# Patient Record
Sex: FEMALE | Race: WHITE | NOT HISPANIC OR LATINO | URBAN - METROPOLITAN AREA
[De-identification: names, ages, dates, MRNs, and addresses within clinical notes are randomized per-mention and may not be internally consistent; named-entity substitution may affect disease eponyms.]

---

## 2019-08-20 ENCOUNTER — CLINICAL SUPPORT (OUTPATIENT)
Dept: FAMILY MEDICINE | Facility: CLINIC | Age: 64
End: 2019-08-20

## 2019-08-20 DIAGNOSIS — Z00.00 ROUTINE GENERAL MEDICAL EXAMINATION AT A HEALTH CARE FACILITY: Primary | ICD-10-CM

## 2019-08-20 PROCEDURE — 80305 DRUG TEST PRSMV DIR OPT OBS: CPT | Mod: S$GLB,,, | Performed by: FAMILY MEDICINE

## 2019-08-20 PROCEDURE — 86580 PR  TB INTRADERMAL TEST: ICD-10-PCS | Mod: S$GLB,,, | Performed by: FAMILY MEDICINE

## 2019-08-20 PROCEDURE — 80305 PR NON-DOT DRUG SCREENS: ICD-10-PCS | Mod: S$GLB,,, | Performed by: FAMILY MEDICINE

## 2019-08-20 PROCEDURE — 86580 TB INTRADERMAL TEST: CPT | Mod: S$GLB,,, | Performed by: FAMILY MEDICINE

## 2019-08-20 NOTE — PROGRESS NOTES
Claudine  has presented today for a Tb placement screening on behalf of [facility].  Claudine Crawley has completed Tb placement and non-dot drug screen     Total $60

## 2019-08-23 ENCOUNTER — TELEPHONE (OUTPATIENT)
Dept: FAMILY MEDICINE | Facility: CLINIC | Age: 64
End: 2019-08-23

## 2019-08-23 NOTE — TELEPHONE ENCOUNTER
Called pt back explained that her drug test results are not completed yet she called to give us her Drs name and his name Dr roel diaz fax [phone]

## 2019-08-28 ENCOUNTER — TELEPHONE (OUTPATIENT)
Dept: FAMILY MEDICINE | Facility: CLINIC | Age: 64
End: 2019-08-28

## 2020-05-04 ENCOUNTER — OFFICE VISIT (OUTPATIENT)
Dept: FAMILY MEDICINE | Facility: CLINIC | Age: 65
End: 2020-05-04
Payer: COMMERCIAL

## 2020-05-04 ENCOUNTER — TELEPHONE (OUTPATIENT)
Dept: FAMILY MEDICINE | Facility: CLINIC | Age: 65
End: 2020-05-04

## 2020-05-04 VITALS
TEMPERATURE: 98 F | SYSTOLIC BLOOD PRESSURE: 124 MMHG | BODY MASS INDEX: 34.91 KG/M2 | DIASTOLIC BLOOD PRESSURE: 76 MMHG | WEIGHT: 197 LBS | HEIGHT: 63 IN | HEART RATE: 61 BPM

## 2020-05-04 DIAGNOSIS — Z01.818 PREOP GENERAL PHYSICAL EXAM: Primary | ICD-10-CM

## 2020-05-04 DIAGNOSIS — K80.20 GALLSTONES: ICD-10-CM

## 2020-05-04 PROBLEM — F32.0 MILD MAJOR DEPRESSION (H): Chronic | Status: ACTIVE | Noted: 2020-05-04

## 2020-05-04 PROBLEM — J30.2 SEASONAL ALLERGIC RHINITIS, UNSPECIFIED TRIGGER: Status: ACTIVE | Noted: 2020-05-04

## 2020-05-04 PROBLEM — F41.9 ANXIETY: Status: ACTIVE | Noted: 2020-05-04

## 2020-05-04 PROBLEM — J45.30 MILD PERSISTENT ASTHMA WITHOUT COMPLICATION: Chronic | Status: ACTIVE | Noted: 2020-05-04

## 2020-05-04 LAB
CREAT SERPL-MCNC: 0.8 MG/DL (ref 0.52–1.04)
GFR SERPL CREATININE-BSD FRML MDRD: 78 ML/MIN/{1.73_M2}
HGB BLD-MCNC: 14.5 G/DL (ref 11.7–15.7)
POTASSIUM SERPL-SCNC: 4.2 MMOL/L (ref 3.4–5.3)

## 2020-05-04 PROCEDURE — 84132 ASSAY OF SERUM POTASSIUM: CPT | Performed by: NURSE PRACTITIONER

## 2020-05-04 PROCEDURE — 36415 COLL VENOUS BLD VENIPUNCTURE: CPT | Performed by: NURSE PRACTITIONER

## 2020-05-04 PROCEDURE — 93000 ELECTROCARDIOGRAM COMPLETE: CPT | Performed by: NURSE PRACTITIONER

## 2020-05-04 PROCEDURE — 85018 HEMOGLOBIN: CPT | Performed by: NURSE PRACTITIONER

## 2020-05-04 PROCEDURE — 99204 OFFICE O/P NEW MOD 45 MIN: CPT | Performed by: NURSE PRACTITIONER

## 2020-05-04 PROCEDURE — 82565 ASSAY OF CREATININE: CPT | Performed by: NURSE PRACTITIONER

## 2020-05-04 RX ORDER — VENLAFAXINE 37.5 MG/1
37.5 TABLET ORAL 2 TIMES DAILY
COMMUNITY

## 2020-05-04 RX ORDER — TRAZODONE HYDROCHLORIDE 100 MG/1
100 TABLET ORAL PRN
COMMUNITY

## 2020-05-04 RX ORDER — CETIRIZINE HYDROCHLORIDE 10 MG/1
10 TABLET ORAL DAILY
COMMUNITY

## 2020-05-04 RX ORDER — CLONAZEPAM 0.5 MG/1
0.5 TABLET ORAL PRN
COMMUNITY

## 2020-05-04 ASSESSMENT — MIFFLIN-ST. JEOR: SCORE: 1399.78

## 2020-05-04 NOTE — PATIENT INSTRUCTIONS
Detail Level: Detailed Hold etodolac until after surgery.  Tylenol is okay.  Okay to hold morning medications until after surgery.      We will check labs today.  EKG looked good.        Before Your Surgery      Call your surgeon if there is any change in your health. This includes signs of a cold or flu (such as a sore throat, runny nose, cough, rash or fever).    Do not smoke, drink alcohol or take over the counter medicine (unless your surgeon or primary care doctor tells you to) for the 24 hours before and after surgery.    If you take prescribed drugs: Follow your doctor s orders about which medicines to take and which to stop until after surgery.    Eating and drinking prior to surgery: follow the instructions from your surgeon    Take a shower or bath the night before surgery. Use the soap your surgeon gave you to gently clean your skin. If you do not have soap from your surgeon, use your regular soap. Do not shave or scrub the surgery site.  Wear clean pajamas and have clean sheets on your bed.

## 2020-05-04 NOTE — PROGRESS NOTES
"  [facility]  [address] [facility] [id number] [phone]  Dept: [phone]    PRE-OP EVALUATION:  Today's date: 2020    Loren Perez (: 1955) presents for pre-operative evaluation assessment as requested by Dr. Uriarte.  She requires evaluation and anesthesia risk assessment prior to undergoing surgery/procedure for treatment of Gallbladder .    Proposed Surgery/ Procedure: cholecystectomy  Date of Surgery/ Procedure: 2020  Time of Surgery/ Procedure: 12:30  Hospital/Surgical Facility: [facility]  Fax number for surgical facility:   Primary Physician: [facility]  Type of Anesthesia Anticipated: General    Patient has a Health Care Directive or Living Will:  NO    1. NO - Do you have a history of heart attack, stroke, stent, bypass or surgery on an artery in the head, neck, heart or legs?  2. NO - Do you ever have any pain or discomfort in your chest?  3. NO - Do you have a history of  Heart Failure?  4. YES - Are you troubled by shortness of breath when: walking on the level, up a slight hill or at night?  Occasionally, relates to asthma.  No chest pain.   5. NO - Do you currently have a cold, bronchitis or other respiratory infection?  6. NO - Do you have a cough, shortness of breath or wheezing?  7. NO - Do you sometimes get pains in the calves of your legs when you walk?  8. NO - Do you or anyone in your family have previous history of blood clots?  9. NO - Do you or does anyone in your family have a serious bleeding problem such as prolonged bleeding following surgeries or cuts?  10. NO - Have you ever had problems with anemia or been told to take iron pills?  11. YES - Have you had any abnormal blood loss such as black, tarry or bloody stools, or abnormal vaginal bleeding?  Patient reports stools appear \"dark\" recently, not tarry or bloody.  [facility] not [facility].   12. NO - Have you ever had a blood " transfusion?  13. NO - Have you or any of your relatives ever had problems with anesthesia?  14. YES - Do you have sleep apnea, excessive snoring or daytime drowsiness? Hx of COMFORT, untreated, cannot tolerate CPAP mask.   15. NO - Do you have any prosthetic heart valves?  16. NO - Do you have prosthetic joints?  17. NO - Is there any chance that you may be pregnant?      HPI:     HPI related to upcoming procedure: Loren Perez is 66 yo female with a PMH significant for anxiety, depression, asthma and allergies who presents for preoperative physical.  [facility] is new to [facility] health system, [facility]  Her past medical records are not available.  Patient states she is having her gallbladder removed tomorrow at [facility].  She reports she was seen in the ER at [facility] on 4/6/2020 for abdominal pain and diagnosed with gallstones at that time.  Unfortunately these records cannot be accessed via Care Everywhere as it states patient has multiple accounts with [facility] that need to be merged.  Patient reports she has been feeling well.  No recent illness.  No fever, chills, chest pain, shortness of breath, abdominal pain, bowel changes or urinary concerns.  She denies any history of abnormal bleeding or clotting.  Denies previous anesthesia complications.   She denies any personal cardiac history.  No diabetes or hypertension.  She does have a history of asthma- she reports this is generally well controlled with no recent exacerbations.  She has a history of sleep apnea.  She does not use a CPAP as she was not able to tolerate it.  She is a former smoker and quit in 2010.     See problem list for active medical problems.  Problems all longstanding and stable, except as noted/documented.  See ROS for pertinent symptoms related to these conditions.      MEDICAL HISTORY:     Patient Active Problem List    Diagnosis Date Noted     Mild persistent asthma without complication 05/04/2020     Priority: Medium  "    Mild major depression (H) 05/04/2020     Priority: Medium     Anxiety 05/04/2020     Priority: Medium     Seasonal allergic rhinitis, unspecified trigger 05/04/2020     Priority: Medium      Past Medical History:   Diagnosis Date     Allergies      Anxiety      Depressive disorder      Uncomplicated asthma      Past Surgical History:   Procedure Laterality Date     COLPOSCOPY       Current Outpatient Medications   Medication Sig Dispense Refill     Budesonide-Formoterol Fumarate (SYMBICORT IN) Inhale into the lungs as needed       cetirizine (ZYRTEC) 10 MG tablet Take 10 mg by mouth daily       clonazePAM (KLONOPIN) 0.5 MG tablet Take 0.5 mg by mouth as needed for anxiety       ETODOLAC PO        Probiotic Product (PROBIOTIC PO)        traZODone (DESYREL) 100 MG tablet Take 100 mg by mouth as needed for sleep       venlafaxine (EFFEXOR) 37.5 MG tablet Take 37.5 mg by mouth 2 times daily       OTC products: none    No Known Allergies   Latex Allergy: NO    Social History     Tobacco Use     Smoking status: Former Smoker     Last attempt to quit: 5/4/2010     Years since quitting: 10.0     Smokeless tobacco: Never Used   Substance Use Topics     Alcohol use: Not Currently     History   Drug Use Unknown       REVIEW OF SYSTEMS:   Constitutional, neuro, ENT, endocrine, pulmonary, cardiac, gastrointestinal, genitourinary, musculoskeletal, integument and psychiatric systems are negative, except as otherwise noted.    EXAM:   /76   Pulse 61   Temp 98  F (36.7  C) (Oral)   Ht 1.588 m (5' 2.5\")   Wt 89.4 kg (197 lb)   BMI 35.46 kg/m      GENERAL APPEARANCE: healthy, alert and no distress     EYES: EOMI, PERRL     HENT: ear canals and TM's normal and nose and mouth without ulcers or lesions     NECK: no adenopathy, no asymmetry, masses, or scars and thyroid normal to palpation     RESP: lungs clear to auscultation - no rales, rhonchi or wheezes     CV: regular rates and rhythm, normal S1 S2, no S3 or S4 and no " murmur, click or rub     ABDOMEN:  soft, nontender, no HSM or masses and bowel sounds normal     MS: extremities normal- no gross deformities noted, no evidence of inflammation in joints, FROM in all extremities.     SKIN: no suspicious lesions or rashes     NEURO: Normal strength and tone, sensory exam grossly normal, mentation intact and speech normal     PSYCH: mentation appears normal. and affect normal/bright     LYMPHATICS: No cervical adenopathy    DIAGNOSTICS:     EKG: sinus bradycardia, HR 58, normal axis, normal intervals, no acute ST/T changes c/w ischemia, there are no prior tracings available    Labs:  Hemoglobin   Date Value Ref Range Status   05/04/2020 14.5 11.7 - 15.7 g/dL Final     Potassium   Date Value Ref Range Status   05/04/2020 4.2 3.4 - 5.3 mmol/L Final     Creatinine   Date Value Ref Range Status   05/04/2020 0.80 0.52 - 1.04 mg/dL Final       IMPRESSION:   Reason for surgery/procedure: gallstones/ cholecystectomy    The proposed surgical procedure is considered INTERMEDIATE risk.    REVISED CARDIAC RISK INDEX  The patient has the following serious cardiovascular risks for perioperative complications such as (MI, PE, VFib and 3  AV Block):  No serious cardiac risks  INTERPRETATION: 0 risks: Class I (very low risk - 0.4% complication rate)    The patient has the following additional risks for perioperative complications:  No identified additional risks      ICD-10-CM    1. Preop general physical exam  Z01.818 EKG 12-lead complete w/read - Clinics     Creatinine     Potassium     Hemoglobin   2. Gallstones  K80.20        RECOMMENDATIONS:     Obstructive Sleep Apnea (or suspected sleep apnea)  Hospital staff are advised to monitor for sleep related oxygen desaturations due to untreated COMFORT      --Patient is to take all scheduled medications on the day of surgery EXCEPT for modifications listed below.  -advised to hold etodolac until after surgery      APPROVAL GIVEN to proceed with proposed  procedure, without further diagnostic evaluation.       Signed Electronically by: Lucita Barahona CNP    Copy of this evaluation report is provided to requesting physician.    [facility] Preop Guidelines    Revised Cardiac Risk Index

## 2020-05-05 ENCOUNTER — TELEPHONE (OUTPATIENT)
Dept: INTERNAL MEDICINE | Facility: CLINIC | Age: 65
End: 2020-05-05

## 2020-05-05 NOTE — TELEPHONE ENCOUNTER
Reason for Call:  Other call back    Detailed comments: [facility] is calling to request pre op, labs and EKG done 05/04/2020. FAX: [phone]    Phone Number Patient can be reached at: [phone]    Best Time:     Can we leave a detailed message on this number? YES    Call taken on 5/5/2020 at 9:35 AM by aAliyah Head

## 2020-05-08 ENCOUNTER — OFFICE VISIT (OUTPATIENT)
Dept: FAMILY MEDICINE | Facility: CLINIC | Age: 65
End: 2020-05-08
Payer: COMMERCIAL

## 2020-05-08 VITALS
WEIGHT: 200.8 LBS | HEART RATE: 66 BPM | SYSTOLIC BLOOD PRESSURE: 128 MMHG | TEMPERATURE: 97.5 F | BODY MASS INDEX: 36.14 KG/M2 | DIASTOLIC BLOOD PRESSURE: 68 MMHG

## 2020-05-08 DIAGNOSIS — Z09 HOSPITAL DISCHARGE FOLLOW-UP: Primary | ICD-10-CM

## 2020-05-08 DIAGNOSIS — Z90.49 S/P LAPAROSCOPIC CHOLECYSTECTOMY: ICD-10-CM

## 2020-05-08 PROCEDURE — 99214 OFFICE O/P EST MOD 30 MIN: CPT | Performed by: NURSE PRACTITIONER

## 2020-05-08 RX ORDER — OXYCODONE HYDROCHLORIDE 5 MG/1
5 TABLET ORAL EVERY 6 HOURS PRN
COMMUNITY

## 2020-05-08 RX ORDER — ACETAMINOPHEN 325 MG/1
650 TABLET ORAL EVERY 6 HOURS PRN
COMMUNITY

## 2020-05-08 NOTE — PROGRESS NOTES
Subjective     Loren Perez is a 65 year old female with a PMH significant for anxiety, depression, asthma and allergies who presents to clinic today for the following health issues:    HPI     Hospital Follow-up Visit:    Hospital/Nursing Home/IP Rehab Facility: [facility]  Date of Admission: 5/5/2020  Date of Discharge: 5/6/2020  Reason(s) for Admission: Gallbladder surgery      Was your hospitalization related to COVID-19? No   Problems taking medications regularly:  None  Medication changes since discharge: oxycodone  Problems adhering to non-medication therapy:  None    Summary of hospitalization:  Discharge summary unavailable   Patient had scheduled surgery for cholecystectomy on 5/5/20.  She reports she was kept overnight for monitoring due to her history of COMFORT and asthma, she states she needed some oxygen for a while after surgery.  She reports surgery/admission otherwise uneventful.  She was discharged home in the morning.  Since being home she is feeling okay.  Has been a little fatigued.  Thinks the oxycodone is making her a little dizzy and off balance.  Denies any severe pain, but is sore at her incision sites.  Her incisions are still covered.  She is not sure when the dressing are supposed to come off.  She does not have follow-up scheduled with her surgeon. She denies any fever, chills, sweats, nausea or vomiting.  She has been eating and drinking normally.  She has had some constipation.  She is able to pass gas.     To note, [facility] but she states her provider has been unavailable.  Her past medical records are not available.  Her [facility] records cannot be accessed via Care Everywhere as it states patient has multiple accounts with [facility] that need to be merged.     Diagnostic Tests/Treatments reviewed.  Follow up needed: none  Other Healthcare Providers Involved in Patient s Care:         None  Update since discharge: improved.   Post Discharge Medication  Reconciliation: unable to reconcile discharge medications due to records not available.  Patient states no changes other than oxycodone..  Plan of care communicated with patient            Patient Active Problem List   Diagnosis     Mild persistent asthma without complication     Mild major depression (H)     Anxiety     Seasonal allergic rhinitis, unspecified trigger     Past Surgical History:   Procedure Laterality Date     CHOLECYSTECTOMY  05/05/2020     COLPOSCOPY         Social History     Tobacco Use     Smoking status: Former Smoker     Last attempt to quit: 5/4/2010     Years since quitting: 10.0     Smokeless tobacco: Never Used   Substance Use Topics     Alcohol use: Not Currently     Family History   Problem Relation Age of Onset     Cancer Mother      Heart Failure Father      No Known Problems Sister      No Known Problems Brother      No Known Problems Brother      No Known Problems Sister          Current Outpatient Medications   Medication Sig Dispense Refill     acetaminophen (TYLENOL) 325 MG tablet Take 650 mg by mouth every 6 hours as needed for mild pain       Budesonide-Formoterol Fumarate (SYMBICORT IN) Inhale into the lungs as needed       cetirizine (ZYRTEC) 10 MG tablet Take 10 mg by mouth daily       clonazePAM (KLONOPIN) 0.5 MG tablet Take 0.5 mg by mouth as needed for anxiety       ETODOLAC PO        oxyCODONE (ROXICODONE) 5 MG tablet Take 5 mg by mouth every 6 hours as needed for severe pain       Probiotic Product (PROBIOTIC PO)        venlafaxine (EFFEXOR) 37.5 MG tablet Take 37.5 mg by mouth 2 times daily       traZODone (DESYREL) 100 MG tablet Take 100 mg by mouth as needed for sleep       No Known Allergies    Reviewed and updated as needed this visit by Provider  Tobacco  Allergies  Meds  Problems  Med Hx  Surg Hx  Fam Hx         Review of Systems   ROS COMP: Constitutional, HEENT, cardiovascular, pulmonary, gi and gu systems are negative, except as otherwise noted.       Objective    /68   Pulse 66   Temp 97.5  F (36.4  C) (Oral)   Wt 91.1 kg (200 lb 12.8 oz)   BMI 36.14 kg/m    Body mass index is 36.14 kg/m .  Physical Exam   GENERAL: healthy, alert and no distress  EYES: Eyes grossly normal to inspection, PERRL and conjunctivae and sclerae normal  HENT: ear canals and TM's normal, nose and mouth without ulcers or lesions  NECK: no adenopathy, no asymmetry, masses, or scars and thyroid normal to palpation  RESP: lungs clear to auscultation - no rales, rhonchi or wheezes  CV: regular rate and rhythm, normal S1 S2, no S3 or S4, no murmur, click or rub, no peripheral edema and peripheral pulses strong  ABDOMEN: soft, nontender, no hepatosplenomegaly, no masses and bowel sounds normal.  Dressing removed from 4 laparoscopic sites. Very minimal dried blood on gauze dressings.  4 small incision intact, well approximated, no signs of infection.   MS: no gross musculoskeletal defects noted, no edema  SKIN: no suspicious lesions or rashes  NEURO: Normal strength and tone, mentation intact and speech normal  PSYCH: mentation appears normal, affect normal/bright    Diagnostic Test Results:  none         Assessment & Plan       ICD-10-CM    1. Hospital discharge follow-up  Z09    2. S/P laparoscopic cholecystectomy  Z90.49         Patient doing well.  Asked her to follow-up with her surgeon as this is usually recommended after surgery.  Instructed on wound cares and measures to alleviate constipation.  Asked her to back off on pain pills and only use for severe pain.  Instructed her not to mix with her klonopin due to risk of sedation and/or death. Okay to use Tylenol and/or ibuprofen first.     See Patient Instructions below.     Patient Instructions   For constipation, start Metamucil 1 tablespoon with a large glass of water each morning.  You can also Senna-S 1-2 pills twice daily.      Make sure you are drinking enough water.     Back off on the pain pills (oxycodone).  I think  this is this is contributing to the unsteadiness and fatigue.  Okay to take Tylenol 650 mg every 4-6 hours and ibuprofen 400-600 mg every 6-8 hours.      Your incision look good.  Well healed.  No signs of infection.  Showers are okay.  Do not scrub, just let soap and water run off you.  Pat dry.  Watch for signs of infection.      Please schedule follow-up with your surgeon from after the gallbladder surgery.       [facility]         Return if symptoms worsen or fail to improve.    Lucita Barahona, [facility]

## 2020-05-08 NOTE — PATIENT INSTRUCTIONS
For constipation, start Metamucil 1 tablespoon with a large glass of water each morning.  You can also Senna-S 1-2 pills twice daily.      Make sure you are drinking enough water.     Back off on the pain pills (oxycodone).  I think this is this is contributing to the unsteadiness and fatigue.  Okay to take Tylenol 650 mg every 4-6 hours and ibuprofen 400-600 mg every 6-8 hours.      Your incision look good.  Well healed.  No signs of infection.  Showers are okay.  Do not scrub, just let soap and water run off you.  Pat dry.  Watch for signs of infection.      Please schedule follow-up with your surgeon from after the gallbladder surgery.       [facility].

## 2021-05-13 NOTE — LETTER
May 5, 2020      Loren JAMES Perez  [address] [facility]   [place] [address]        Dear ,    We are writing to inform you of your test results.    Your Labs are all Normal can proceed with surgery.    Resulted Orders   Creatinine   Result Value Ref Range    Creatinine 0.80 0.52 - 1.04 mg/dL    GFR Estimate 78 >60 mL/min/[1.73_m2]      Comment:      Non  GFR Calc  Starting 12/18/2018, serum creatinine based estimated GFR (eGFR) will be   calculated using the Chronic Kidney Disease Epidemiology Collaboration   (CKD-EPI) equation.      GFR Estimate If Black 90 >60 mL/min/[1.73_m2]      Comment:       GFR Calc  Starting 12/18/2018, serum creatinine based estimated GFR (eGFR) will be   calculated using the Chronic Kidney Disease Epidemiology Collaboration   (CKD-EPI) equation.     Potassium   Result Value Ref Range    Potassium 4.2 3.4 - 5.3 mmol/L   Hemoglobin   Result Value Ref Range    Hemoglobin 14.5 11.7 - 15.7 g/dL       If you have any questions or concerns, please call the clinic at the number listed above.       Sincerely,        Lucita Barahona, CNP                
Perianal abscess

## 2021-08-10 NOTE — TELEPHONE ENCOUNTER
Caller: Olivia malone/ Dr Sandip Barone   [phone] (Today,  1:33 PM)             Olivia would like to speak with Dr. Michelle about Claudine Crawley (no other info available about patient).             Name band;

## 2021-10-19 PROBLEM — F32.9 MAJOR DEPRESSION: Chronic | Status: ACTIVE | Noted: 2020-05-04

## 2022-08-26 NOTE — TELEPHONE ENCOUNTER
----- Message from Lorene Ahuja sent at 8/23/2019 11:50 AM CDT -----  Contact: [phone]/self  Patient calling to speak with you concerning her test results   Please call back to assist at [phone]   Bed: 41  Expected date:   Expected time:   Means of arrival:   Comments:  [place]- ETOH